# Patient Record
Sex: FEMALE | URBAN - METROPOLITAN AREA
[De-identification: names, ages, dates, MRNs, and addresses within clinical notes are randomized per-mention and may not be internally consistent; named-entity substitution may affect disease eponyms.]

---

## 2017-04-14 ENCOUNTER — VIRTUAL VISIT (OUTPATIENT)
Dept: FAMILY MEDICINE | Facility: OTHER | Age: 62
End: 2017-04-14

## 2017-04-15 NOTE — PROGRESS NOTES
Date:   Clinician: Genesis Lott  Clinician NPI: [id number]  Patient: Maria Del Rosario Wright  Patient : 1955  Patient Address: [address] [place] [address]  Patient Phone: [phone]  Visit Protocol: URI  Patient Summary:  Maria Del Rosario is a 61 year old ( : 1955 ) female who initiated a Zip for a presumed sinus infection.     Maria Del Rosario was evaluated today in a clinic and diagnosed with sinusitis. Maria Del Rosario was not given any prescription medications at that time.   Her  symptoms started gradually 10-13 days ago  and consist of cough, rhinitis, hoarse voice, dysphagia, post-nasal drainage, sore throat, nasal congestion, loss of appetite, fever, and malaise.   She denies itchy eyes, myalgias, dyspnea, vomiting, nausea, chest pain, ear pain, petechial or purpuric rash, and chills. She denies a history of facial surgery.   Her moderate nasal secretions are green. Her mild facial pain or pressure feels worse when bending over or leaning forward and is located on both sides of her head. The facial pain or pressure started after the onset of other URI symptoms.  Maria Del Rosario has a mild headache. The headache did not start before her other symptoms and is located on one side of her head. Maria Del Rosario does not have a history of migraine headaches.   She has a mildly painful sore throat. When Maria Del Rosario swallows liquids or saliva, she experiences mild pain. The patient denies having white spots on the tonsils similar to a sample strep throat image provided. She has not been exposed to Strep. When asked to feel her neck she reported enlarged lymph nodes. Maria Del Rosario noted that the enlarged neck lymph nodes were first noticed when prompted to check for lymphadenopathy. She denies axillary lymphadenopathy.   Her moderately severe (cough every 5-10 minutes) productive cough is more bothersome at night. She believes the cough is caused by post-nasal drainage. Her cough produces green sputum.   Her highest temperature was 100.5 degrees  Fahrenheit and her current temperature is 99.5 degrees Fahrenheit. Maria Del Rosario has had a fever for 3 - 4 days. She used the tympanic method for measuring her temperature.   She has passed urine in the past 12 hours.   Maria Del Rosario denies having COPD or other chronic lung disease.   Pulse: self-reported pulse rate as: 9 beats in 10 seconds.   Current Temperature (F): 99.5    The patient has not taken antibiotics within the last 6 weeks.   Weight (in lbs): 130   She states she is not pregnant and denies breastfeeding. She no longer menstruates.   Maria Del Rosario does not smoke or use smokeless tobacco.  MEDICATIONS:  Actifed, Drixoral, Triaminic, thyroid, and ibuprofen (Advil, Motrin)  , ALLERGIES:   penicillin/amoxicillin/augmentin    Clinician Response:  Dear Maria Del Rosario,  Based on the information you have provided, you likely have  acute sinusitis, otherwise known as a sinus infection.   I am prescribing an antibiotic Azithromycin to help you feel better. Azithromycin comes as a 250 mg tablet. Swallow two (2) tablets on the first day then one tablet a day for days 2-5. There is no refill with this prescription.   Unless your are allergic to the over-the-counter medication(s) below, I recommend using:   Guaifenesin + dextromethorphan (Robitussin DM, Mucinex DM). This is an over-the-counter medication that does not require a prescription.   A decongestant such as pseudoephedrine (Sudafed or store brand) to help your symptoms. This is an over-the-counter medication that does not require a prescription.   Acetaminophen (Tylenol), which helps to reduce your discomfort and fever. Take 1-2 pills every 4-6 hours. This is an over-the-counter medication that does not require a prescription.   Ibuprofen. Take 1-3 200mg tablets (200-600mg) every 8 hours to help with the discomfort. Make sure to take the ibuprofen with food. Do not exceed 2400mg in 24 hours. This is an over-the-counter medication that does not require a prescription.   Try the following to  help with your throat pain and discomfort:     Use throat lozenges    Gargle with warm salt water (1/4 teaspoon of salt per 8 ounce glass of water).    Suck on frozen items such as Popsicles or ice cubes.     Call 911 or go to the emergency room if you feel that your throat is closing off, you suddenly develop a rash, you are unable to swallow fluids, you are drooling, or you are having difficulty breathing.  Follow up with your primary care clinician if your symptoms are not improving in 3-4 days.   Drink plenty of liquids, especially water and take time to rest your body. This may mean taking a nap or going to bed earlier. Your body is fighting an infection and liquids and rest will improve the pace of recovery. Remember to regularly wash your hands and avoid close contact with others to prevent spreading your infection.   Some people develop allergies to antibiotics. If you notice a new rash, significant swelling or difficulty breathing, stop the medication immediately and go into a clinic for physical evaluation.   Some women may also develop a yeast infection as a side effect of taking antibiotics. If you notice symptoms of a yeast infection, please use Zipnosis to get treatment.   If you become pregnant during this course of treatment with medication, stop taking the medication and contact your primary care clinician.   Diagnosis: Acute Sinusitis  Diagnosis ICD: J01.90  Prescription: azithromycin (Zithromax) 250mg oral tablet 6 tablets, 5 days supply. Take two tablets by mouth day 1, then 1 tablet days 2-5.. Refills: 0, Refill as needed: no, Allow substitutions: yes  Addendum created: April 14 23:46:49, 2017 created by: Genesis Lott body: If she has an allergy to macrolids then another antibiotic should be given instead of azithromycin

## 2019-12-30 ENCOUNTER — VIRTUAL VISIT (OUTPATIENT)
Dept: FAMILY MEDICINE | Facility: OTHER | Age: 64
End: 2019-12-30

## 2019-12-31 NOTE — PROGRESS NOTES
"Date: 2019 22:38:45  Clinician: Elsy Velasquez  Clinician NPI: [id number]  Patient: Maria Del Rosario Wright  Patient : 1955  Patient Address: [address] [place] [address]  Patient Phone: [phone]  Visit Protocol: URI  Patient Summary:  Maria Del Rosario is a 64 year old ( : 1955 ) female who initiated a Visit for cold, sinus infection, or influenza. When asked the question \"Please sign me up to receive news, health information and promotions. \", Maria Del Rosario responded \"No\".    Maria Del Rosario states her symptoms started suddenly 2-3 weeks ago.   Her symptoms consist of a headache, malaise, and a cough.   Symptom details     Cough: Maria Del Rosario coughs every 5-10 minutes and her cough is more bothersome at night. Phlegm comes into her throat when she coughs. She believes the phlegm causes the cough. The color of the phlegm is green.     Headache: She states the headache is mild (1-3 on a 10 point pain scale).      Maria Del Rosario denies having sore throat, ear pain, enlarged lymph nodes, chills, nasal congestion, fever, rhinitis, wheezing, teeth pain, facial pain or pressure, and myalgias. She also denies double sickening (worsening symptoms after initial improvement), taking antibiotic medication for the symptoms, and having recent facial or sinus surgery in the past 60 days. She is not experiencing dyspnea.   Precipitating events  She has not recently been exposed to someone with influenza. Maria Del Rosario has been in close contact with the following high risk individuals: adults 65 or older and people with asthma, heart disease or diabetes.   Pertinent medical history  Maria Del Rosario does not get yeast infections when she takes antibiotics.   Weight: 165 lbs   Maria Del Rosario does not smoke or use smokeless tobacco.   Additional information as reported by the patient (free text): Symptoms have continued for 3 weeks. Cough has been productive. However, phlegm has become green recently. Temp has been running between 99 to 100 degrees.   Weight: 165 lbs    " MEDICATIONS: hydrocortisone oral, levothyroxine oral, ALLERGIES: Penicillins  Clinician Response:  Dear Maria Del Rosario,  Based on the information provided, you have a viral upper respiratory infection, otherwise known as a cold. Symptoms vary from person to person, but can include sneezing, coughing, a runny nose, sore throat, and headache and range from mild to severe.  Unfortunately, there are no medications that can cure a cold, so treatment is focused on controlling symptoms as much as possible. Most people gradually feel better until symptoms are gone in 1-2 weeks.  Medication information  Because you have a viral infection, antibiotics will not help you get better. Treating a viral infection with antibiotics could actually make you feel worse.  I am prescribing:       Fluticasone 50 mcg/actuation nasal spray. Inhale 2 sprays in each nostril 1 time per day; after 1 week, may adjust to 1 - 2 sprays in each nostril 1 time per day. This medication takes several days to start working, so keep taking it even if it doesn't help right away. There are no refills with this prescription.      Benzonatate (Tessalon Perles) 100 mg oral capsule. Take 1-2 capsules by mouth 3 times per day as needed for your cough. There are no refills with this prescription.     Self care  The following tips will keep you as comfortable as possible while you recover:     Rest    Drink plenty of water and other liquids    Take a hot shower to loosen congestion    Take a spoonful of honey to reduce your cough     When to seek care  Please be seen in a clinic or urgent care if new symptoms develop, or symptoms become worse.   Diagnosis: Viral URI  Diagnosis ICD: J06.9  Prescription: benzonatate (Tessalon Perles) 100 mg oral capsule 30 capsule, 5 days supply. Take 1-2 capsules by mouth 3 times per day as needed. Refills: 0, Refill as needed: no, Allow substitutions: yes  Prescription: fluticasone 50 mcg/actuation nasal spray,suspension 1 120 spray  aerosol with adapter (grams), 30 days supply. Inhale 2 sprays in each nostril 1 time per day; after 1 week, may adjust to 1 - 2 sprays in each nostril 1 time per day.. Refills: 0, Refill as needed: no, Allow substitutions: yes